# Patient Record
Sex: FEMALE | Race: WHITE | NOT HISPANIC OR LATINO
[De-identification: names, ages, dates, MRNs, and addresses within clinical notes are randomized per-mention and may not be internally consistent; named-entity substitution may affect disease eponyms.]

---

## 2020-09-29 PROBLEM — Z00.00 ENCOUNTER FOR PREVENTIVE HEALTH EXAMINATION: Status: ACTIVE | Noted: 2020-09-29

## 2020-11-25 ENCOUNTER — APPOINTMENT (OUTPATIENT)
Dept: OBGYN | Facility: CLINIC | Age: 60
End: 2020-11-25

## 2022-10-24 ENCOUNTER — OFFICE (OUTPATIENT)
Dept: URBAN - METROPOLITAN AREA CLINIC 113 | Facility: CLINIC | Age: 62
Setting detail: OPHTHALMOLOGY
End: 2022-10-24
Payer: MEDICAID

## 2022-10-24 DIAGNOSIS — H52.4: ICD-10-CM

## 2022-10-24 DIAGNOSIS — H10.533: ICD-10-CM

## 2022-10-24 DIAGNOSIS — H16.222: ICD-10-CM

## 2022-10-24 DIAGNOSIS — H16.221: ICD-10-CM

## 2022-10-24 DIAGNOSIS — H25.13: ICD-10-CM

## 2022-10-24 DIAGNOSIS — H43.393: ICD-10-CM

## 2022-10-24 PROBLEM — H16.223 DRY EYE SYNDROME K SICCA; RIGHT EYE, LEFT EYE, BOTH EYES: Status: ACTIVE | Noted: 2022-10-24

## 2022-10-24 PROCEDURE — 83861 MICROFLUID ANALY TEARS: CPT | Performed by: OPHTHALMOLOGY

## 2022-10-24 PROCEDURE — 92250 FUNDUS PHOTOGRAPHY W/I&R: CPT | Performed by: OPHTHALMOLOGY

## 2022-10-24 PROCEDURE — 92015 DETERMINE REFRACTIVE STATE: CPT | Performed by: OPHTHALMOLOGY

## 2022-10-24 PROCEDURE — 92004 COMPRE OPH EXAM NEW PT 1/>: CPT | Performed by: OPHTHALMOLOGY

## 2022-10-24 ASSESSMENT — CONFRONTATIONAL VISUAL FIELD TEST (CVF)
OD_FINDINGS: FULL
OS_FINDINGS: FULL

## 2022-10-24 ASSESSMENT — REFRACTION_MANIFEST
OU_VA: 20/20-1
OD_AXIS: 040
OD_CYLINDER: -0.75
OS_ADD: +2.25
OS_VA1: 20/20
OS_SPHERE: +2.75
OS_AXIS: 080
OS_VA2: 20/20
OS_CYLINDER: -0.75
OD_VA1: 20/25
OD_VA2: 20/20
OD_SPHERE: +2.00
OD_ADD: +2.25

## 2022-10-24 ASSESSMENT — KERATOMETRY
OS_AXISANGLE_DEGREES: 014
OS_K2POWER_DIOPTERS: 47.25
OD_K2POWER_DIOPTERS: 47.00
OS_K1POWER_DIOPTERS: 46.00
OD_K1POWER_DIOPTERS: 47.00
OD_AXISANGLE_DEGREES: 090

## 2022-10-24 ASSESSMENT — VISUAL ACUITY
OS_BCVA: 20/25
OD_BCVA: 20/40

## 2022-10-24 ASSESSMENT — REFRACTION_AUTOREFRACTION
OD_AXIS: 037
OD_CYLINDER: -1.00
OS_SPHERE: +3.00
OD_SPHERE: +2.00
OS_CYLINDER: -0.75
OS_AXIS: 083

## 2022-10-24 ASSESSMENT — TONOMETRY
OD_IOP_MMHG: 17
OS_IOP_MMHG: 18

## 2022-10-24 ASSESSMENT — AXIALLENGTH_DERIVED
OD_AL: 21.8175
OD_AL: 21.8593
OS_AL: 21.6042
OS_AL: 21.6862

## 2022-10-24 ASSESSMENT — SPHEQUIV_DERIVED
OS_SPHEQUIV: 2.625
OD_SPHEQUIV: 1.5
OS_SPHEQUIV: 2.375
OD_SPHEQUIV: 1.625

## 2022-10-24 ASSESSMENT — SUPERFICIAL PUNCTATE KERATITIS (SPK)
OD_SPK: T 1+
OS_SPK: T 1+

## 2023-07-22 ENCOUNTER — EMERGENCY (EMERGENCY)
Facility: HOSPITAL | Age: 63
LOS: 1 days | Discharge: HOME | End: 2023-07-22
Attending: EMERGENCY MEDICINE
Payer: MEDICARE

## 2023-07-22 VITALS
RESPIRATION RATE: 16 BRPM | WEIGHT: 259.93 LBS | TEMPERATURE: 98 F | HEART RATE: 80 BPM | DIASTOLIC BLOOD PRESSURE: 63 MMHG | OXYGEN SATURATION: 95 % | SYSTOLIC BLOOD PRESSURE: 109 MMHG

## 2023-07-22 PROCEDURE — 99283 EMERGENCY DEPT VISIT LOW MDM: CPT | Mod: GC

## 2023-07-22 PROCEDURE — 99282 EMERGENCY DEPT VISIT SF MDM: CPT

## 2023-07-22 NOTE — ED PROVIDER NOTE - PATIENT PORTAL LINK FT
You can access the FollowMyHealth Patient Portal offered by [facility] by registering at the following website: [email or web address]. By joining [employer]’s FollowMyHealth portal, you will also be able to view your health information using other applications (apps) compatible with our system.

## 2023-07-22 NOTE — ED PROVIDER NOTE - PHYSICAL EXAMINATION
Gen: Well appearing in NAD  Head: NC/AT  Neck: trachea midline  Card: regular rate and rhythm, lymphedema b/l, no erythema, no induration  Resp:  CTAB, no tachypnea  Abd: soft, non-distended, non-tender  Ext: no deformities above reported baseline  Neuro:  A&O, no motor or sensory deficits above reported baseline  Skin:  Warm and dry as visualized  Psych:  Normal affect and mood

## 2023-07-22 NOTE — ED PROVIDER NOTE - OBJECTIVE STATEMENT
63-year-old female patient with past medical history of chronic lymphedema states that she was at a diner tonight when someone called the ambulance because they thought she was short of breath.  Patient reports that she has shortness of breath chronically, she feels that she is at her baseline.  She endorses having a couple drinks tonight.  She wants to go home so that she can take care of her cat.

## 2023-07-22 NOTE — ED PROVIDER NOTE - CLINICAL SUMMARY MEDICAL DECISION MAKING FREE TEXT BOX
patient with past medical history of chronic lymphedema was brought to the hospital by ambulance because someone at a diner thought she was short of breath.  In ED she is without complaint and wants to go home to take care of her cat.   She is alert and oriented x4 and capable of making her own decisions. Medicaid cab will be arranged.

## 2023-07-22 NOTE — ED ADULT TRIAGE NOTE - CHIEF COMPLAINT QUOTE
BIBEMS c/o BL swelling of lower extremities. Patient state she has lymphoderma, denies pain/discomfort. Patient admits to drinking ETOH today, ambulates with a walker.

## 2023-07-22 NOTE — ED PROVIDER NOTE - ATTENDING CONTRIBUTION TO CARE
Dr. Monroe : I have personally seen and examined this patient at the bedside. I have fully participated in the care of this patient. I have reviewed all pertinent clinical information, including history, physical exam, plan and the Resident's note and agree except as noted.     63-year-old female patient with past medical history of chronic lymphedema states that she was at a diner tonight when someone called the ambulance because they thought she was short of breath.  Patient reports that she has shortness of breath chronically, she feels that she is at her baseline.  She endorses having a couple drinks tonight.  She wants to go home so that she can take care of her cat. deneis feeling sob more than her baseline, denies feeling sob at all at this time. states that has a lot going on and might loose her house in the next month so was out having a few drinks today due to stress.    Denies f/c/n/v/cp/sob/palpitations/cough/abd.pain/d/c/dysuria/hematuria. no sick contacts/recent travel.    PE:  head; atraumatic normocephalic  eyes: perrla  Heart: rrr s1s2  lungs: ctab  abd: soft, nt nd + bs no rebound/guarding no cva ttp  le: bl lymphadema no redness no increased warmth no calf ttp  back: no midline cervical/thoracic/lumbar ttp      -->pt denies having any complaints + alcohl use today but aaox 3 normal gait with a walker at baseline  [facility] STABLE will dc as pt would like to go home and denies any complaints

## 2023-07-23 VITALS
RESPIRATION RATE: 18 BRPM | OXYGEN SATURATION: 97 % | TEMPERATURE: 99 F | DIASTOLIC BLOOD PRESSURE: 98 MMHG | SYSTOLIC BLOOD PRESSURE: 140 MMHG | HEART RATE: 93 BPM

## 2023-07-23 NOTE — ED ADULT NURSE NOTE - OBJECTIVE STATEMENT
P A&OX4. Pt arrived by ambulance and stated that she does not need to be here and does not need to be seen.  Pt seen by MD and requesting to go back home.  Pt awaiting ambulance.

## 2024-01-30 NOTE — ED ADULT TRIAGE NOTE - HEART RATE (BEATS/MIN)
80 [FreeTextEntry1] : This note was authored by Concepción Delgado working as a scribe for Dr.Gary Lopez. I, Dr. Luis Lopez have reviewed the content of this note and confirm it is true and accurate. I personally performed the history and physical examination and made all the decisions 01/30/2024